# Patient Record
Sex: FEMALE | Race: WHITE | Employment: FULL TIME | URBAN - METROPOLITAN AREA
[De-identification: names, ages, dates, MRNs, and addresses within clinical notes are randomized per-mention and may not be internally consistent; named-entity substitution may affect disease eponyms.]

---

## 2017-03-06 RX ORDER — VENLAFAXINE HYDROCHLORIDE 150 MG/1
CAPSULE, EXTENDED RELEASE ORAL
Qty: 90 CAPSULE | Refills: 1 | Status: SHIPPED | OUTPATIENT
Start: 2017-03-06 | End: 2017-08-31

## 2017-03-06 NOTE — TELEPHONE ENCOUNTER
Requesting venlafaxine  LOV: 9/16/16 (setlur)  RTC:   Last Labs:   Filled: 2/26/16 # 90 with 3 refills    No future appointments.

## 2017-05-15 NOTE — TELEPHONE ENCOUNTER
Requesting deplin   LOV: 2/26/16  RTC: 6 months   Last Labs:   Filled: 2/26/16 # 90  with 3 refills    No future appointments.

## 2017-05-16 RX ORDER — LEVOMEFOLATE/ALGAL OIL 15-90.314
CAPSULE ORAL
Qty: 90 CAPSULE | Refills: 3 | Status: SHIPPED | OUTPATIENT
Start: 2017-05-16 | End: 2017-09-12

## 2017-06-02 ENCOUNTER — E-VISIT (OUTPATIENT)
Dept: FAMILY MEDICINE CLINIC | Facility: CLINIC | Age: 52
End: 2017-06-02

## 2017-06-02 DIAGNOSIS — J01.90 ACUTE SINUSITIS, RECURRENCE NOT SPECIFIED, UNSPECIFIED LOCATION: Primary | ICD-10-CM

## 2017-06-02 PROCEDURE — 98969 ONLINE SERVICE BY HC PRO: CPT | Performed by: NURSE PRACTITIONER

## 2017-06-02 RX ORDER — AMOXICILLIN AND CLAVULANATE POTASSIUM 875; 125 MG/1; MG/1
1 TABLET, FILM COATED ORAL 2 TIMES DAILY
Qty: 20 TABLET | Refills: 0 | Status: SHIPPED | OUTPATIENT
Start: 2017-06-02 | End: 2017-06-12

## 2017-06-02 NOTE — PROGRESS NOTES
Frantz Sosa is a 46year old female. HPI:   See answers to questions above. Just returned from international travel.         Current Outpatient Prescriptions:  DEPLIN 15 15-90.314 MG Oral Cap TAKE ONE CAPSULE BY MOUTH ONE TIME DAILY Disp: 90 ca

## 2017-06-13 ENCOUNTER — OFFICE VISIT (OUTPATIENT)
Dept: FAMILY MEDICINE CLINIC | Facility: CLINIC | Age: 52
End: 2017-06-13

## 2017-06-13 VITALS
HEART RATE: 76 BPM | TEMPERATURE: 97 F | RESPIRATION RATE: 16 BRPM | SYSTOLIC BLOOD PRESSURE: 122 MMHG | WEIGHT: 136 LBS | HEIGHT: 65 IN | DIASTOLIC BLOOD PRESSURE: 80 MMHG | BODY MASS INDEX: 22.66 KG/M2

## 2017-06-13 DIAGNOSIS — Z12.39 SCREENING FOR BREAST CANCER: Primary | ICD-10-CM

## 2017-06-13 DIAGNOSIS — R05.9 COUGH: ICD-10-CM

## 2017-06-13 DIAGNOSIS — J40 BRONCHITIS: ICD-10-CM

## 2017-06-13 DIAGNOSIS — Z12.11 SCREENING FOR COLON CANCER: ICD-10-CM

## 2017-06-13 PROCEDURE — 99213 OFFICE O/P EST LOW 20 MIN: CPT | Performed by: NURSE PRACTITIONER

## 2017-06-13 RX ORDER — FLUTICASONE PROPIONATE AND SALMETEROL 100; 50 UG/1; UG/1
1 POWDER RESPIRATORY (INHALATION) 2 TIMES DAILY
Qty: 1 PACKAGE | Refills: 0 | COMMUNITY
Start: 2017-06-13 | End: 2017-09-12

## 2017-06-13 RX ORDER — ALBUTEROL SULFATE 90 UG/1
2 AEROSOL, METERED RESPIRATORY (INHALATION) EVERY 4 HOURS PRN
Qty: 1 INHALER | Refills: 6 | Status: SHIPPED | OUTPATIENT
Start: 2017-06-13 | End: 2017-09-12

## 2017-06-13 RX ORDER — METHYLPREDNISOLONE 4 MG/1
TABLET ORAL
Qty: 1 KIT | Refills: 0 | Status: SHIPPED | OUTPATIENT
Start: 2017-06-13 | End: 2017-09-12

## 2017-06-13 NOTE — PATIENT INSTRUCTIONS
Thank you for choosing [facility] at [place] [address]  To Do: [address] [facility]  1. Start taking steroid dose pack as directed. Proair inhaler--> rescue as needed for shortness of breath every 4-6 hours.   Advair inhaler--> 1 puff 2 gemma notify us and stop treatment if problems arise, but know that our intention is that the benefits outweigh those potential risks and we strive to make you healthier and to improve your quality of life.     Referrals, and Radiology Information:    If your ins

## 2017-06-13 NOTE — PROGRESS NOTES
[facility] Internal Medicine Office Note  Chief Complaint:   Patient presents with:  Cough: dry cough. States on 06/20/17 was treated with Augmentin 875mg x 10 days, finishedd the med. Using Mucinex- DM, Proair & Advair from a previous script.   D DEPLIN 15 15-90.314 MG Oral Cap TAKE ONE CAPSULE BY MOUTH ONE TIME DAILY Disp: 90 capsule Rfl: 3   VENLAFAXINE HCL  MG Oral Capsule SR 24 Hr TAKE 1 CAPSULE BY MOUTH ONCE DAILY.  Disp: 90 capsule Rfl: 1   Multiple Vitamin (MULTI-VITAMIN) Oral Tab Viry Pierce Skin: Skin is warm and dry.      ASSESSMENT AND PLAN:   Sarah Santillan is a 46year old female with  Screening for breast cancer  (primary encounter diagnosis)  Cough  Bronchitis  Screening for colon cancer    The plan is as follows  Mic Briscoe was Outcome: Patient verbalizes understanding. Patient is notified to call with any questions, complications, allergies, or worsening or changing symptoms. Patient is to call with any side effects or complications from the treatments as a result of today.    Pa

## 2017-09-05 RX ORDER — VENLAFAXINE HYDROCHLORIDE 150 MG/1
CAPSULE, EXTENDED RELEASE ORAL
Qty: 30 CAPSULE | Refills: 0 | Status: SHIPPED | OUTPATIENT
Start: 2017-09-05 | End: 2017-09-12

## 2017-09-12 ENCOUNTER — OFFICE VISIT (OUTPATIENT)
Dept: FAMILY MEDICINE CLINIC | Facility: CLINIC | Age: 52
End: 2017-09-12

## 2017-09-12 VITALS
HEART RATE: 76 BPM | BODY MASS INDEX: 23.16 KG/M2 | DIASTOLIC BLOOD PRESSURE: 82 MMHG | RESPIRATION RATE: 16 BRPM | HEIGHT: 65 IN | WEIGHT: 139 LBS | SYSTOLIC BLOOD PRESSURE: 122 MMHG

## 2017-09-12 DIAGNOSIS — Z12.31 ENCOUNTER FOR SCREENING MAMMOGRAM FOR MALIGNANT NEOPLASM OF BREAST: ICD-10-CM

## 2017-09-12 DIAGNOSIS — Z12.11 COLON CANCER SCREENING: ICD-10-CM

## 2017-09-12 DIAGNOSIS — F32.A DEPRESSION, UNSPECIFIED DEPRESSION TYPE: Primary | ICD-10-CM

## 2017-09-12 DIAGNOSIS — F41.9 ANXIETY: ICD-10-CM

## 2017-09-12 DIAGNOSIS — E55.9 VITAMIN D DEFICIENCY: ICD-10-CM

## 2017-09-12 DIAGNOSIS — E04.9 ENLARGED THYROID: ICD-10-CM

## 2017-09-12 PROCEDURE — 99213 OFFICE O/P EST LOW 20 MIN: CPT | Performed by: NURSE PRACTITIONER

## 2017-09-12 RX ORDER — LEVOMEFOLATE/ALGAL OIL 15-90.314
1 CAPSULE ORAL
Qty: 90 CAPSULE | Refills: 1 | Status: SHIPPED | OUTPATIENT
Start: 2017-09-12 | End: 2018-03-21

## 2017-09-12 RX ORDER — VENLAFAXINE HYDROCHLORIDE 150 MG/1
CAPSULE, EXTENDED RELEASE ORAL
Qty: 90 CAPSULE | Refills: 1 | Status: SHIPPED | OUTPATIENT
Start: 2017-09-12 | End: 2017-10-02

## 2017-09-12 NOTE — PATIENT INSTRUCTIONS
Thank you for choosing [facility]ilio at [place] [address]  To Do: [address] [facility]  1. Refills given  2. Fasting blood work  3. Schedule for PAP physical  4.  Schedule mammogram and colonscopy     Effective 6/19/17 until November 2017  Due All therapies have potential risk of harm or side effects or medication interactions.  It is your duty and for your safety to discuss with the pharmacist and our office with questions, and to notify us and stop treatment if problems arise, but know that ou

## 2017-09-12 NOTE — PROGRESS NOTES
[address] [facility] Internal Medicine Office Note  Chief Complaint:   Patient presents with:  Medication Request    HPI:   This is a 46year old female coming in for  HPI  Here today for refill on deplin and effexor, reports that she has been using them f Appears stated age, well groomed. Physical Exam   Vitals reviewed. Constitutional: She is oriented to person, place, and time. Vital signs are normal. She appears well-developed and well-nourished. HENT:   Head: Normocephalic and atraumatic.    Right and physical exam      Orders Placed This Encounter      Assay, Thyroid Stim Hormone      CBC With Differential With Platelet      Comp Metabolic Panel (14)      Lipid Panel      Urinalysis, Routine      Vitamin D, 25-Hydroxy  Meds & Refills for this Visit (PHQ-2/PHQ-9): Over the LAST 2 WEEKS

## 2017-10-02 ENCOUNTER — TELEPHONE (OUTPATIENT)
Dept: FAMILY MEDICINE CLINIC | Facility: CLINIC | Age: 52
End: 2017-10-02

## 2017-10-02 DIAGNOSIS — F41.9 ANXIETY: ICD-10-CM

## 2017-10-02 DIAGNOSIS — F32.A DEPRESSION, UNSPECIFIED DEPRESSION TYPE: ICD-10-CM

## 2017-10-02 RX ORDER — VENLAFAXINE HYDROCHLORIDE 150 MG/1
CAPSULE, EXTENDED RELEASE ORAL
Qty: 7 CAPSULE | Refills: 1 | Status: SHIPPED | OUTPATIENT
Start: 2017-10-02 | End: 2018-04-26

## 2017-10-02 NOTE — TELEPHONE ENCOUNTER
Requesting 1 week supply of Effexor 75mg  LOV: 9/12/17  RTC: one month  Last Labs: n/a  Filled: 9/12/17 #90 with 1 refills    No future appointments. Rx was approved for 6 months already - one week supply sent for her. Patient informed RX was sent.

## 2017-10-02 NOTE — TELEPHONE ENCOUNTER
Pt called out of town and forgot her rx pt is requesting a week of her med ,states she will be heading towards another town on Tuesday     Name of Medication: effexor 75mg     Dose:     How is medication prescribed:    Specific name of pharmacy and location

## 2017-10-23 ENCOUNTER — OFFICE VISIT (OUTPATIENT)
Dept: FAMILY MEDICINE CLINIC | Facility: CLINIC | Age: 52
End: 2017-10-23

## 2017-10-23 VITALS
WEIGHT: 139 LBS | TEMPERATURE: 97 F | DIASTOLIC BLOOD PRESSURE: 70 MMHG | BODY MASS INDEX: 23.16 KG/M2 | RESPIRATION RATE: 16 BRPM | HEIGHT: 65 IN | HEART RATE: 72 BPM | SYSTOLIC BLOOD PRESSURE: 110 MMHG

## 2017-10-23 DIAGNOSIS — Z12.4 SCREENING FOR CERVICAL CANCER: ICD-10-CM

## 2017-10-23 DIAGNOSIS — Z00.00 ROUTINE MEDICAL EXAM: Primary | ICD-10-CM

## 2017-10-23 PROCEDURE — 99396 PREV VISIT EST AGE 40-64: CPT | Performed by: FAMILY MEDICINE

## 2017-10-23 PROCEDURE — 87624 HPV HI-RISK TYP POOLED RSLT: CPT | Performed by: FAMILY MEDICINE

## 2017-10-23 PROCEDURE — 90471 IMMUNIZATION ADMIN: CPT | Performed by: FAMILY MEDICINE

## 2017-10-23 PROCEDURE — 88175 CYTOPATH C/V AUTO FLUID REDO: CPT | Performed by: FAMILY MEDICINE

## 2017-10-23 PROCEDURE — 90686 IIV4 VACC NO PRSV 0.5 ML IM: CPT | Performed by: FAMILY MEDICINE

## 2017-10-23 NOTE — PROGRESS NOTES
Patient presents with:  Physical: states her mammogram is scheduled for next week. Pap  Imm/Inj: Flu vaccine today      HPI:  Ike Pope is a 46year old female here today for preventative visit.       Immunizations patient is up-to-date with a of children: N/A     Occupational History  None on file     Social History Main Topics   Smoking status: Former Smoker  0.10 Packs/day  For 10.00 Years     Types: Cigarettes    Quit date: 1/1/2017    Smokeless tobacco: Never Used    Alcohol use Yes    Comm vagina, cervix, uterus, adnexa, anus and perineum. No cervical motion tenderness. PAP: Pap smear done today. Breasts: breast appear normal, no suspicious masses, no skin or nipple changes/discharge. No supraclavicular or axillary nodes.       ASSESSMENT/PL

## 2017-10-30 ENCOUNTER — HOSPITAL ENCOUNTER (OUTPATIENT)
Dept: MAMMOGRAPHY | Age: 52
Discharge: HOME | End: 2017-10-30
Attending: INTERNAL MEDICINE
Payer: COMMERCIAL

## 2017-10-30 DIAGNOSIS — Z12.31 ENCOUNTER FOR SCREENING MAMMOGRAM FOR MALIGNANT NEOPLASM OF BREAST: ICD-10-CM

## 2017-10-30 PROCEDURE — 77067 SCR MAMMO BI INCL CAD: CPT | Performed by: NURSE PRACTITIONER

## 2017-11-29 ENCOUNTER — TELEPHONE (OUTPATIENT)
Dept: FAMILY MEDICINE CLINIC | Facility: CLINIC | Age: 52
End: 2017-11-29

## 2017-11-29 NOTE — TELEPHONE ENCOUNTER
PT states that she is having RLQ Pain near pelvic bone, pt states B/P and pulse are normal. Sharp pain, no nausea or vomiting, no fevers or chills. Last Bowel movement today.  Patient states that pain has subsided and she noticed that it improved after she

## 2017-11-29 NOTE — TELEPHONE ENCOUNTER
Pt called states she was having Lower right abdomen pain near pelvic bone  At work , ambulance was called was told it was not appendex or kidneys requesting to speak with nurse please advise

## 2017-11-30 ENCOUNTER — OFFICE VISIT (OUTPATIENT)
Dept: FAMILY MEDICINE CLINIC | Facility: CLINIC | Age: 52
End: 2017-11-30

## 2017-11-30 VITALS
HEART RATE: 84 BPM | WEIGHT: 141 LBS | BODY MASS INDEX: 23.49 KG/M2 | HEIGHT: 65 IN | TEMPERATURE: 99 F | SYSTOLIC BLOOD PRESSURE: 130 MMHG | DIASTOLIC BLOOD PRESSURE: 72 MMHG | RESPIRATION RATE: 16 BRPM

## 2017-11-30 DIAGNOSIS — J01.90 ACUTE NON-RECURRENT SINUSITIS, UNSPECIFIED LOCATION: ICD-10-CM

## 2017-11-30 DIAGNOSIS — R10.31 RIGHT LOWER QUADRANT ABDOMINAL PAIN: Primary | ICD-10-CM

## 2017-11-30 PROCEDURE — 99214 OFFICE O/P EST MOD 30 MIN: CPT | Performed by: FAMILY MEDICINE

## 2017-11-30 RX ORDER — AMOXICILLIN AND CLAVULANATE POTASSIUM 875; 125 MG/1; MG/1
1 TABLET, FILM COATED ORAL 2 TIMES DAILY
Qty: 14 TABLET | Refills: 0 | Status: SHIPPED | OUTPATIENT
Start: 2017-11-30 | End: 2017-12-07

## 2017-11-30 NOTE — PROGRESS NOTES
HPI:   Amarilis Monroy is a 45year old female that presents for follow-up abdominal pain and new nasal congestion. Patient had sharp right sided lower abdominal pain yesterday while at work.   She states that an ambulance was called but her joseph erythema or effusion   Nose: patent, no nasal discharge    Throat:  No tonsillar erythema or exudate. Mouth:  No oral lesions or ulcerations, good dentition. NECK: Supple, no cervical LAD, no thyromegaly.   SKIN: No rashes, no skin lesion, no bruising,

## 2017-11-30 NOTE — PATIENT INSTRUCTIONS
Flonase daily  Neti pot 1-2 times per day  Do not use antibiotic (augmentin) unless symptoms for more than 10-14 days          Sinusitis (No Antibiotics)    The sinuses are air-filled spaces within the bones of the face.  They connect to the inside of the n stomach ulcer, talk with your doctor before using these medicines. Aspirin should never be used in anyone under 25years of age who is ill with a fever.  It may cause severe liver damage.)  · Use nasal rinses or irrigation as instructed by your health care

## 2018-03-21 ENCOUNTER — OFFICE VISIT (OUTPATIENT)
Dept: FAMILY MEDICINE CLINIC | Facility: CLINIC | Age: 53
End: 2018-03-21

## 2018-03-21 VITALS
HEART RATE: 80 BPM | TEMPERATURE: 97 F | HEIGHT: 65 IN | BODY MASS INDEX: 22.66 KG/M2 | WEIGHT: 136 LBS | DIASTOLIC BLOOD PRESSURE: 80 MMHG | SYSTOLIC BLOOD PRESSURE: 122 MMHG | RESPIRATION RATE: 16 BRPM

## 2018-03-21 DIAGNOSIS — F32.A DEPRESSION, UNSPECIFIED DEPRESSION TYPE: Primary | ICD-10-CM

## 2018-03-21 DIAGNOSIS — F41.9 ANXIETY: ICD-10-CM

## 2018-03-21 DIAGNOSIS — F43.21 GRIEF: ICD-10-CM

## 2018-03-21 DIAGNOSIS — F32.A DEPRESSION, UNSPECIFIED DEPRESSION TYPE: ICD-10-CM

## 2018-03-21 PROCEDURE — 99213 OFFICE O/P EST LOW 20 MIN: CPT | Performed by: FAMILY MEDICINE

## 2018-03-21 RX ORDER — BUPROPION HYDROCHLORIDE 150 MG/1
150 TABLET ORAL DAILY
Qty: 30 TABLET | Refills: 0 | Status: SHIPPED | OUTPATIENT
Start: 2018-03-21 | End: 2018-04-20

## 2018-03-21 NOTE — PROGRESS NOTES
[facility] Visit Note  3/21/2018      Subjective:      Patient ID: Herlinda Cat is a 45year old female. Chief Complaint:  Patient presents with:  Menopause: having trouble again with memory & concentration.       HPI:  Ramin Bennett Refill: 0    2. Anxiety  - OP REFERRAL TO [facility]  - BuPROPion HCl ER, XL, 150 MG Oral Tablet 24 Hr; Take 1 tablet (150 mg total) by mouth daily. Dispense: 30 tablet; Refill: 0    3.  Grief  - OP REFERRAL TO [facility]  - BuPROPion HCl ER, XL, 150 MG Oral T

## 2018-03-22 RX ORDER — LEVOMEFOLATE/ALGAL OIL 15-90.314
1 CAPSULE ORAL DAILY
Qty: 90 CAPSULE | Refills: 3 | Status: SHIPPED | OUTPATIENT
Start: 2018-03-22 | End: 2019-06-15

## 2018-03-22 NOTE — TELEPHONE ENCOUNTER
Requesting Deplin   LOV: 3/21/18  RTC: 3 months   Last Relevant Labs: n/a   Filled: 9/12/17 #90 with 1 refills    Future Appointments  Date Time Provider Alvaro Cowan   3/26/2018 3:30 PM Christopher Angeles LCSW [facility]   4/20/2018 7:00 AM Oscar Gutierrez MD [facility] [address] [facility] [address]       Pended if okay.

## 2018-04-17 DIAGNOSIS — F32.A DEPRESSION, UNSPECIFIED DEPRESSION TYPE: ICD-10-CM

## 2018-04-17 DIAGNOSIS — F41.9 ANXIETY: ICD-10-CM

## 2018-04-17 DIAGNOSIS — F43.21 GRIEF: ICD-10-CM

## 2018-04-17 RX ORDER — BUPROPION HYDROCHLORIDE 150 MG/1
150 TABLET ORAL DAILY
Qty: 30 TABLET | Refills: 0 | OUTPATIENT
Start: 2018-04-17

## 2018-04-17 NOTE — TELEPHONE ENCOUNTER
Requesting Bupropion   LOV: 3/21/18  RTC: 1 month   Last Relevant Labs: n/a   Filled: 3/21/18 #30 with 0 refills    Future Appointments  Date Time Provider Alvaro Cowan   4/20/2018 7:00 AM Yarelis Dailey MD [facility] [address] [facility] [address]   4/26/2018 3:30 P

## 2018-04-20 PROBLEM — F43.21 GRIEF: Status: ACTIVE | Noted: 2018-04-20

## 2018-04-20 NOTE — PROGRESS NOTES
[facility] Visit Note  4/20/2018      Subjective:      Patient ID: Theron Aragon is a 45year old female. Chief Complaint:  Patient presents with:  Depression: here for 1 month f/u, Wellbutrin was added.  States she is doing much brittany Examination   General:  Alert, in no acute distress  HEENT: NCAT, EOMI, mucus membranes moist   Neck:  No cervical lymphadenopathy  CV: Regular rate and rhythm. No murmurs, gallops, or rubs.   Lungs:  Clear to auscultation B, no wheezes, rales, or rhonchi,

## 2018-04-23 DIAGNOSIS — F41.9 ANXIETY: ICD-10-CM

## 2018-04-23 DIAGNOSIS — F32.A DEPRESSION, UNSPECIFIED DEPRESSION TYPE: ICD-10-CM

## 2018-04-23 DIAGNOSIS — F43.21 GRIEF: ICD-10-CM

## 2018-04-23 RX ORDER — BUPROPION HYDROCHLORIDE 150 MG/1
150 TABLET ORAL DAILY
Qty: 30 TABLET | Refills: 0 | OUTPATIENT
Start: 2018-04-23

## 2018-04-23 NOTE — TELEPHONE ENCOUNTER
Requesting  BUPROPION HCL  MG TABLET  Will file in chart as: BUPROPION HCL ER, XL, 150 MG Oral Tablet 24 Hr  The source prescription was reordered on 4/20/2018 by Xiomara Osorio MD.  Take 1 tablet (150 mg total) by mouth daily.        Disp: 30 t

## 2018-04-26 DIAGNOSIS — F32.A DEPRESSION, UNSPECIFIED DEPRESSION TYPE: ICD-10-CM

## 2018-04-26 DIAGNOSIS — F41.9 ANXIETY: ICD-10-CM

## 2018-04-26 NOTE — TELEPHONE ENCOUNTER
Requesting: Effexor XR   LOV: 4/20/18  RTC: 3 mos  Last Labs: n/a  Filled: 10/2/17 #7 with 1 refill    Future Appointments  Date Time Provider Alvaro Cowan   7/20/2018 7:00 AM Mendel Richardson MD [facility] [address] [facility] [address]

## 2018-04-27 RX ORDER — VENLAFAXINE HYDROCHLORIDE 150 MG/1
CAPSULE, EXTENDED RELEASE ORAL
Qty: 90 CAPSULE | Refills: 0 | Status: SHIPPED | OUTPATIENT
Start: 2018-04-27 | End: 2018-07-20

## 2018-07-16 DIAGNOSIS — F43.21 GRIEF: ICD-10-CM

## 2018-07-16 DIAGNOSIS — F33.1 MODERATE EPISODE OF RECURRENT MAJOR DEPRESSIVE DISORDER (HCC): ICD-10-CM

## 2018-07-16 DIAGNOSIS — F41.9 ANXIETY: ICD-10-CM

## 2018-07-16 RX ORDER — BUPROPION HYDROCHLORIDE 150 MG/1
150 TABLET ORAL DAILY
Qty: 90 TABLET | Refills: 0 | Status: SHIPPED | OUTPATIENT
Start: 2018-07-16 | End: 2018-07-20

## 2018-07-16 NOTE — TELEPHONE ENCOUNTER
Medication(s) to Refill: Bupropion ER,  mg Disp 90 Refills      Last time Medication was Filled: 4/20/18      Last office Visit with Provider: 4/20/18      When Patient was Due Back to the Office : 3 months      Future Appointments: 7/20/18

## 2018-07-17 ENCOUNTER — PATIENT MESSAGE (OUTPATIENT)
Dept: FAMILY MEDICINE CLINIC | Facility: CLINIC | Age: 53
End: 2018-07-17

## 2018-07-17 NOTE — TELEPHONE ENCOUNTER
From: Regina Shafer  To: Vicky Alegre MD  Sent: 7/17/2018 12:00 PM CDT  Subject: Non-Urgent Medical Question    Dr. Dat Reina,    I am travelling to [facility] [place] [place] in August for 17 days.  Just reviewed my vaccines and prescription needs with our Honey Calhoun

## 2018-07-20 ENCOUNTER — TELEPHONE (OUTPATIENT)
Dept: FAMILY MEDICINE CLINIC | Facility: CLINIC | Age: 53
End: 2018-07-20

## 2018-07-20 NOTE — TELEPHONE ENCOUNTER
for adult traveller's can give 1 dose of meningitis vaccine and repeat vaccine q 5 yrs if risk remains. So no repeat needed for 5 yrs. Enjoy [facility] [place]

## 2018-07-20 NOTE — PROGRESS NOTES
[facility] Visit Note  7/20/2018      Subjective:      Patient ID: Christine Zaragoza is a 45year old female. Chief Complaint:  Patient presents with:  Depression: Here for 3 month f/u, states she has been seeing Carola Bhatti & she's doing fine. something more than counseling. No h/o seizures. Imm/Inj: Wants to discuss vaccines needed for  upcoming trip to [facility] [place] in August for 17 days. Requesting malaria prophylaxis and traveller's diarrhea prophylaxis.  Had the yellow fever vaccine which USE    5. Need for malaria prophylaxis  - Atovaquone-Proguanil HCl 250-100 MG Oral Tab; Take 1 tablet by mouth daily. Start 2 days before exposure and stop 7 days after exposure  Dispense: 28 tablet;  Refill: 0        Return in about 6 months (around 1/20/2

## 2018-07-20 NOTE — TELEPHONE ENCOUNTER
Interaction between Levofloxacin and Venlafaxine, when used concurently can cause heart arrhythmia's. Okay to proceed due to Levofloxacin being short term or would you like to change medication?

## 2018-07-26 ENCOUNTER — TELEPHONE (OUTPATIENT)
Dept: FAMILY MEDICINE CLINIC | Facility: CLINIC | Age: 53
End: 2018-07-26

## 2018-07-26 DIAGNOSIS — F33.1 MODERATE EPISODE OF RECURRENT MAJOR DEPRESSIVE DISORDER (HCC): ICD-10-CM

## 2018-07-26 DIAGNOSIS — F43.21 GRIEF: ICD-10-CM

## 2018-07-26 RX ORDER — VENLAFAXINE HYDROCHLORIDE 150 MG/1
150 CAPSULE, EXTENDED RELEASE ORAL DAILY
Qty: 90 CAPSULE | Refills: 1 | Status: SHIPPED | OUTPATIENT
Start: 2018-07-26 | End: 2018-12-07

## 2018-07-26 NOTE — TELEPHONE ENCOUNTER
Patient states that the Venlafaxine will need to be sent to the [facility] in [place]. Patient thought that originally it was sent to [facility]. She states that they did not receive the prescription and that they do not cover this.

## 2018-10-13 DIAGNOSIS — F33.1 MODERATE EPISODE OF RECURRENT MAJOR DEPRESSIVE DISORDER (HCC): ICD-10-CM

## 2018-10-13 DIAGNOSIS — F43.21 GRIEF: ICD-10-CM

## 2018-10-13 DIAGNOSIS — F41.9 ANXIETY: ICD-10-CM

## 2018-10-15 ENCOUNTER — HOSPITAL ENCOUNTER (OUTPATIENT)
Dept: MAMMOGRAPHY | Age: 53
Discharge: HOME | End: 2018-10-15
Attending: FAMILY MEDICINE
Payer: COMMERCIAL

## 2018-10-15 DIAGNOSIS — Z12.31 ENCOUNTER FOR SCREENING MAMMOGRAM FOR MALIGNANT NEOPLASM OF BREAST: ICD-10-CM

## 2018-10-15 PROCEDURE — 77067 SCR MAMMO BI INCL CAD: CPT | Performed by: FAMILY MEDICINE

## 2018-10-15 PROCEDURE — 77063 BREAST TOMOSYNTHESIS BI: CPT | Performed by: FAMILY MEDICINE

## 2018-10-15 RX ORDER — BUPROPION HYDROCHLORIDE 150 MG/1
150 TABLET ORAL DAILY
Qty: 90 TABLET | Refills: 0 | Status: SHIPPED | OUTPATIENT
Start: 2018-10-15 | End: 2018-12-07

## 2018-10-15 NOTE — TELEPHONE ENCOUNTER
Requesting Bupropion   Last Relevant Labs: n/a   Filled: 7/20/18 #90 with 1 refills - to [facility] speciality pharmacy would not be able to fill.  Okay'd remaining refill to [facility]    Future Appointments   Date Time Provider Alvaro Cowan   10/15

## 2018-12-07 ENCOUNTER — OFFICE VISIT (OUTPATIENT)
Dept: FAMILY MEDICINE CLINIC | Facility: CLINIC | Age: 53
End: 2018-12-07
Payer: COMMERCIAL

## 2018-12-07 VITALS
DIASTOLIC BLOOD PRESSURE: 80 MMHG | HEIGHT: 65 IN | WEIGHT: 137.63 LBS | BODY MASS INDEX: 22.93 KG/M2 | TEMPERATURE: 98 F | RESPIRATION RATE: 16 BRPM | SYSTOLIC BLOOD PRESSURE: 122 MMHG | HEART RATE: 80 BPM

## 2018-12-07 DIAGNOSIS — R92.2 DENSE BREAST TISSUE ON MAMMOGRAM: ICD-10-CM

## 2018-12-07 DIAGNOSIS — Z12.39 BREAST CANCER SCREENING: ICD-10-CM

## 2018-12-07 DIAGNOSIS — F33.1 MODERATE EPISODE OF RECURRENT MAJOR DEPRESSIVE DISORDER (HCC): ICD-10-CM

## 2018-12-07 DIAGNOSIS — Z00.00 LABORATORY EXAM ORDERED AS PART OF ROUTINE GENERAL MEDICAL EXAMINATION: ICD-10-CM

## 2018-12-07 DIAGNOSIS — Z12.11 COLON CANCER SCREENING: ICD-10-CM

## 2018-12-07 DIAGNOSIS — F41.9 ANXIETY: ICD-10-CM

## 2018-12-07 DIAGNOSIS — Z00.00 ROUTINE MEDICAL EXAM: Primary | ICD-10-CM

## 2018-12-07 PROCEDURE — 99396 PREV VISIT EST AGE 40-64: CPT | Performed by: FAMILY MEDICINE

## 2018-12-07 RX ORDER — VENLAFAXINE HYDROCHLORIDE 150 MG/1
150 CAPSULE, EXTENDED RELEASE ORAL DAILY
Qty: 90 CAPSULE | Refills: 3 | Status: SHIPPED | OUTPATIENT
Start: 2018-12-07 | End: 2020-01-24

## 2018-12-07 RX ORDER — BUPROPION HYDROCHLORIDE 150 MG/1
150 TABLET ORAL DAILY
Qty: 90 TABLET | Refills: 3 | Status: SHIPPED | OUTPATIENT
Start: 2018-12-07 | End: 2020-01-13

## 2018-12-07 NOTE — PROGRESS NOTES
Patient presents with:  Physical: Annual px & med refills. HPI:  Lisbeth Restrepo is a 52year old female here today for preventative visit.       Immunizations patient is up- to-date with a Tdap and flu shot.        Breast cancer screening-[place] prior to visit. Seasonal                  Social History    Socioeconomic History      Marital status:        Spouse name: Not on file      Number of children: Not on file      Years of education: Not on file      Highest education level: Not on EXAM:  /80   Pulse 80   Temp 97.8 °F (36.6 °C) (Temporal)   Resp 16   Ht 65\"   Wt 137 lb 9.6 oz   BMI 22.90 kg/m²   GENERAL APPEARANCE:  Alert, no acute distress, appears stated age  HEENT:  Head- Normocephalic, atraumatic.     Eyes- Extraocular move total) by mouth daily. Dispense: 90 tablet; Refill: 3  - Venlafaxine HCl  MG Oral Capsule SR 24 Hr; Take 1 capsule (150 mg total) by mouth daily. Dispense: 90 capsule;  Refill: 3  - ASSAY, THYROID STIM HORMONE        Patient verbalized understanding

## 2019-03-05 ENCOUNTER — TELEPHONE (OUTPATIENT)
Dept: FAMILY MEDICINE CLINIC | Facility: CLINIC | Age: 54
End: 2019-03-05

## 2019-03-05 ENCOUNTER — E-VISIT (OUTPATIENT)
Dept: FAMILY MEDICINE CLINIC | Facility: CLINIC | Age: 54
End: 2019-03-05

## 2019-03-05 ENCOUNTER — HOSPITAL ENCOUNTER (OUTPATIENT)
Age: 54
Discharge: HOME | End: 2019-03-05
Attending: FAMILY MEDICINE
Payer: COMMERCIAL

## 2019-03-05 VITALS
RESPIRATION RATE: 16 BRPM | TEMPERATURE: 98 F | HEART RATE: 89 BPM | OXYGEN SATURATION: 100 % | DIASTOLIC BLOOD PRESSURE: 77 MMHG | SYSTOLIC BLOOD PRESSURE: 122 MMHG

## 2019-03-05 DIAGNOSIS — T70.1XXA: ICD-10-CM

## 2019-03-05 DIAGNOSIS — R51.9 ACUTE NONINTRACTABLE HEADACHE, UNSPECIFIED HEADACHE TYPE: Primary | ICD-10-CM

## 2019-03-05 DIAGNOSIS — R51.9 HEADACHE DISORDER: Primary | ICD-10-CM

## 2019-03-05 PROCEDURE — 99204 OFFICE O/P NEW MOD 45 MIN: CPT

## 2019-03-05 PROCEDURE — 96361 HYDRATE IV INFUSION ADD-ON: CPT

## 2019-03-05 PROCEDURE — 96374 THER/PROPH/DIAG INJ IV PUSH: CPT

## 2019-03-05 PROCEDURE — 99214 OFFICE O/P EST MOD 30 MIN: CPT

## 2019-03-05 PROCEDURE — 98969 ONLINE SERVICE BY HC PRO: CPT | Performed by: NURSE PRACTITIONER

## 2019-03-05 RX ORDER — KETOROLAC TROMETHAMINE 30 MG/ML
30 INJECTION, SOLUTION INTRAMUSCULAR; INTRAVENOUS ONCE
Status: COMPLETED | OUTPATIENT
Start: 2019-03-05 | End: 2019-03-05

## 2019-03-05 RX ORDER — SODIUM CHLORIDE 9 MG/ML
1000 INJECTION, SOLUTION INTRAVENOUS ONCE
Status: COMPLETED | OUTPATIENT
Start: 2019-03-05 | End: 2019-03-05

## 2019-03-05 RX ORDER — PREDNISONE 20 MG/1
TABLET ORAL
Qty: 10 TABLET | Refills: 0 | Status: SHIPPED | OUTPATIENT
Start: 2019-03-05 | End: 2019-03-25 | Stop reason: ALTCHOICE
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
## 2019-03-05 NOTE — ED PROVIDER NOTES
Patient Seen in: [facility]    History   Patient presents with:  Headache (neurologic)    Stated Complaint: HEADACHE X 5 DAYS    HPI    This 49-year-old female presents to the office with complaint of headache for the last 5 day teens/20's    had nipple d/c, gets an annual check       Past Surgical History:   Procedure Laterality Date   • CHOLECYSTECTOMY  02/2004   • DILATION & CURETTAGE CERVICAL STUMP  12/28/2007,11/04/2008    miscarriages       Family history reviewed and is not CMS is intact to both hands. SKIN: Warm and dry. NEURO: Cranial nerves II through XII are intact.         ED Course   Labs Reviewed - No data to display             MDM       At the patient's request, I did contact Belen More for a physician to 2 days  if not improving        Medications Prescribed:  Current Discharge Medication List    START taking these medications    predniSONE 20 MG Oral Tab  Take 2 tablets once daily with breakfast for 5 days. Start on 3/6/19.   Qty: 10 tablet Refills: 0
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
## 2019-03-25 ENCOUNTER — OFFICE VISIT (OUTPATIENT)
Dept: FAMILY MEDICINE CLINIC | Facility: CLINIC | Age: 54
End: 2019-03-25
Payer: COMMERCIAL

## 2019-03-25 VITALS
HEIGHT: 65 IN | DIASTOLIC BLOOD PRESSURE: 70 MMHG | BODY MASS INDEX: 22.99 KG/M2 | HEART RATE: 95 BPM | SYSTOLIC BLOOD PRESSURE: 110 MMHG | WEIGHT: 138 LBS | TEMPERATURE: 98 F | RESPIRATION RATE: 16 BRPM

## 2019-03-25 DIAGNOSIS — M54.50 ACUTE RIGHT-SIDED LOW BACK PAIN WITHOUT SCIATICA: Primary | ICD-10-CM

## 2019-03-25 PROCEDURE — 99213 OFFICE O/P EST LOW 20 MIN: CPT | Performed by: FAMILY MEDICINE

## 2019-03-25 RX ORDER — METHYLPREDNISOLONE 4 MG/1
TABLET ORAL
Qty: 1 KIT | Refills: 0 | Status: SHIPPED | OUTPATIENT
Start: 2019-03-25 | End: 2019-08-19 | Stop reason: ALTCHOICE

## 2019-03-25 NOTE — PATIENT INSTRUCTIONS
Exercises to Strengthen Your Lower Back  Strong lower back and abdominal muscles work together to support your spine. The exercises below will help strengthen the lower back. It is important that you begin exercising slowly and increase levels gradually. Standin. Wall squats: Stand with your back against the wall. Move your feet about 12 inches away from the wall. Tighten your stomach muscles, and slowly bend your knees until they are at about a 45 degree angle. Do not go down too far.  Hold about 5 se 4. Abdominal crunch: Perform a pelvic tilt (above) flattening your lower back against the floor. Holding the tension in your abdominal muscles, take another breath and raise your shoulder blades off the ground (this is not a full sit-up).  Keep your head in © 8707-6320 [employer]. [address] [facility] [place], [place], [address]. All rights reserved. This information is not intended as a substitute for professional medical care. Always follow your healthcare professional's instructions.         Lumbar Most people have low back pain now and then. In many cases, it isn’t serious and self-care can help. Sometimes low back pain can be a sign of a bigger problem. Call your healthcare provider if your pain returns often or gets worse over time.  For the long-t · Your pain is constant. · You have pain, tingling, or numbness in your leg. · You feel pain in a new area of your back. · You notice that the pain isn’t decreasing after more than a week.    Date Last Reviewed: 3/1/2018  © 9079-1622 [employer] · During the first 24 to 72 hours after an injury or flare-up, apply an ice pack to the painful area for 20 minutes, then remove it for 20 minutes. Do this over a period of 60 to 90 minutes or several times a day. This will reduce swelling and pain.  Always Be careful if you are given prescription pain medicine, narcotics, or medicine for muscle spasm. They can cause drowsiness, affect your coordination, reflexes, or judgment. Do not drive or operate heavy machinery when taking these medicines.  Take pain medi · Lift one bent knee about 2 inches then return it to the floor and lift the other about 2 inches. Keep your abdominal muscles tight and continue to breathe. These motions should be slow and controlled without your pelvis rocking side to side.   · Repeat 10 © 1249-8034 [employer] [address]. [address] [facility] [place], [address] [place]. All rights reserved. This information is not intended as a substitute for professional medical care. Always follow your healthcare professional's instructions.         Back Ex

## 2019-03-25 NOTE — PROGRESS NOTES
[facility] Visit Note  3/25/2019      Subjective:      Patient ID: Jana Levy is a 52year old female.     Chief Complaint:  Patient presents with:  Low Back Pain: c/o having right sided low back pinching type pain since shoveling the referral.      Return for if symptoms worsen/don't improve, call/mychart if you want PT.

## 2019-06-14 DIAGNOSIS — F32.A DEPRESSION, UNSPECIFIED DEPRESSION TYPE: ICD-10-CM

## 2019-06-14 DIAGNOSIS — F41.9 ANXIETY: ICD-10-CM

## 2019-06-14 NOTE — TELEPHONE ENCOUNTER
Pharmacy is calling to get a refill on L-Methylfolate-Algae (DEPLIN 15) 15-90.314 MG Oral Cap to [facility] mail order. Tao Cummings

## 2019-06-15 RX ORDER — LEVOMEFOLATE/ALGAL OIL 15-90.314
1 CAPSULE ORAL DAILY
Qty: 90 CAPSULE | Refills: 3 | Status: SHIPPED | OUTPATIENT
Start: 2019-06-15 | End: 2019-08-19

## 2019-06-15 NOTE — TELEPHONE ENCOUNTER
Requested Prescriptions     Pending Prescriptions Disp Refills   • L-Methylfolate-Algae (DEPLIN 15) 15-90.314 MG Oral Cap 90 capsule 3     Sig: Take 1 capsule by mouth daily.      LOV: 3/25/19  RTC: prn  Last Relevant Labs: 12/7/18  Filled: 3/22/18 #90 with

## 2019-08-01 ENCOUNTER — MED REC SCAN ONLY (OUTPATIENT)
Dept: FAMILY MEDICINE CLINIC | Facility: CLINIC | Age: 54
End: 2019-08-01

## 2019-08-19 RX ORDER — GARLIC EXTRACT 500 MG
1 CAPSULE ORAL DAILY PRN
COMMUNITY
End: 2020-11-04

## 2019-08-20 ENCOUNTER — OFFICE VISIT (OUTPATIENT)
Dept: PODIATRY CLINIC | Facility: CLINIC | Age: 54
End: 2019-08-20
Payer: COMMERCIAL

## 2019-08-20 DIAGNOSIS — S92.354D CLOSED NONDISPLACED FRACTURE OF FIFTH METATARSAL BONE OF RIGHT FOOT WITH ROUTINE HEALING, SUBSEQUENT ENCOUNTER: ICD-10-CM

## 2019-08-20 DIAGNOSIS — M77.50 CAPSULITIS OF METATARSOPHALANGEAL (MTP) JOINT: ICD-10-CM

## 2019-08-20 DIAGNOSIS — M79.671 RIGHT FOOT PAIN: Primary | ICD-10-CM

## 2019-08-20 PROCEDURE — 99203 OFFICE O/P NEW LOW 30 MIN: CPT | Performed by: PODIATRIST

## 2019-08-20 NOTE — PROGRESS NOTES
Fernando Colon is a 52year old female. Patient presents with:  Fracture: Right foot 5th meta base. LOV 7-. 3/10 for pain        HPI:     Presents today for follow-up evaluation.   Still has some pain at the base of the fifth metatarsal which History      Marital status:       Spouse name: Not on file      Number of children: Not on file      Years of education: Not on file      Highest education level: Not on file    Tobacco Use      Smoking status: Former Smoker        Packs/day: 0.10 status. She is also advised that she can get back into her orthotics.       ASSESSMENT AND PLAN:   Diagnoses and all orders for this visit:    Right foot pain  -     XR FOOT WEIGHTBEARING (3 VIEWS), RIGHT   (CPT=73630)    Closed nondisplaced fracture of fi

## 2019-08-30 ENCOUNTER — HOSPITAL ENCOUNTER (OUTPATIENT)
Facility: HOSPITAL | Age: 54
Setting detail: HOSPITAL OUTPATIENT SURGERY
Discharge: HOME | End: 2019-08-30
Attending: INTERNAL MEDICINE | Admitting: INTERNAL MEDICINE
Payer: COMMERCIAL

## 2019-08-30 VITALS
WEIGHT: 132 LBS | TEMPERATURE: 98 F | OXYGEN SATURATION: 98 % | HEART RATE: 72 BPM | SYSTOLIC BLOOD PRESSURE: 115 MMHG | HEIGHT: 65 IN | BODY MASS INDEX: 21.99 KG/M2 | DIASTOLIC BLOOD PRESSURE: 78 MMHG | RESPIRATION RATE: 16 BRPM

## 2019-08-30 DIAGNOSIS — Z12.11 SPECIAL SCREENING FOR MALIGNANT NEOPLASMS, COLON: ICD-10-CM

## 2019-08-30 PROCEDURE — 0DBK8ZX EXCISION OF ASCENDING COLON, VIA NATURAL OR ARTIFICIAL OPENING ENDOSCOPIC, DIAGNOSTIC: ICD-10-PCS | Performed by: INTERNAL MEDICINE

## 2019-08-30 PROCEDURE — 99152 MOD SED SAME PHYS/QHP 5/>YRS: CPT | Performed by: INTERNAL MEDICINE

## 2019-08-30 PROCEDURE — 88305 TISSUE EXAM BY PATHOLOGIST: CPT | Performed by: INTERNAL MEDICINE

## 2019-08-30 PROCEDURE — 99153 MOD SED SAME PHYS/QHP EA: CPT | Performed by: INTERNAL MEDICINE

## 2019-08-30 RX ORDER — MIDAZOLAM HYDROCHLORIDE 1 MG/ML
INJECTION INTRAMUSCULAR; INTRAVENOUS
Status: DISCONTINUED | OUTPATIENT
Start: 2019-08-30 | End: 2019-08-30

## 2019-08-30 RX ORDER — SODIUM CHLORIDE, SODIUM LACTATE, POTASSIUM CHLORIDE, CALCIUM CHLORIDE 600; 310; 30; 20 MG/100ML; MG/100ML; MG/100ML; MG/100ML
INJECTION, SOLUTION INTRAVENOUS CONTINUOUS
Status: DISCONTINUED | OUTPATIENT
Start: 2019-08-30 | End: 2019-08-30

## 2019-08-30 NOTE — OPERATIVE REPORT
ENDOSCOPY OPERATIVE REPORT    Patient Name:  Grace Rankin  Medical Record #: [id number]  YOB: 1965  Date of Procedure: 8/30/2019    Preoperative Diagnosis:  screening    Postoperative Diagnosis: polyps    Procedure Performed: Colon identification of small, flat, or subtle lesions challenging and reduced accuracy. These areas were washed extensively and appeared to be effective. Overall prep was good    A 7 mm polyp seen in the ascending and removed with the cold snare and recovered.

## 2019-08-30 NOTE — H&P
David Clements presents for endoscopy. Denies gi bleeding, abdominal pain, altered bowel habits, or fhx of crc.      States prep is clear    States she has had sedation in the past and it was well tolerated    First time screening       The ris hematuria, change in energy level or fatigue. Mood is stable. Denies new arthralgias/myalgias. Denies rashes or new paresthesias/neurologic symptoms. Denies bleeding or bruising.  ROS negative except as stated    GEN: nad  HENT: eomi, mmm, normal cephalic

## 2019-09-03 NOTE — PROGRESS NOTES
Here are the  biopsy/pathology findings from your recent Colonoscopy :  --the colon polyps were \"adenoma\" type of polyps. These types of polyps are precancerous polyps that were removed.            Follow-up information: As we discussed on the phone, I hans

## 2019-09-04 ENCOUNTER — TELEPHONE (OUTPATIENT)
Dept: FAMILY MEDICINE CLINIC | Facility: CLINIC | Age: 54
End: 2019-09-04

## 2019-09-04 NOTE — TELEPHONE ENCOUNTER
Regarding: 3rd Request Non-Urgent Medical Question  ----- Message from Jerrod Zavala MD sent at 9/3/2019  4:29 PM CDT -----       ----- Message from Juliano Nelson to Sharlene Artis sent at 6/2/2014 12:42 PM -----   Has the  faxed a letter o

## 2019-10-01 ENCOUNTER — TELEPHONE (OUTPATIENT)
Dept: PODIATRY CLINIC | Facility: CLINIC | Age: 54
End: 2019-10-01

## 2019-10-01 ENCOUNTER — OFFICE VISIT (OUTPATIENT)
Dept: PODIATRY CLINIC | Facility: CLINIC | Age: 54
End: 2019-10-01
Payer: COMMERCIAL

## 2019-10-01 DIAGNOSIS — M79.671 RIGHT FOOT PAIN: ICD-10-CM

## 2019-10-01 DIAGNOSIS — S92.354D CLOSED NONDISPLACED FRACTURE OF FIFTH METATARSAL BONE OF RIGHT FOOT WITH ROUTINE HEALING, SUBSEQUENT ENCOUNTER: Primary | ICD-10-CM

## 2019-10-01 DIAGNOSIS — M20.11 HALLUX VALGUS OF RIGHT FOOT: ICD-10-CM

## 2019-10-01 DIAGNOSIS — M20.41 HAMMER TOE OF RIGHT FOOT: ICD-10-CM

## 2019-10-01 PROCEDURE — 99213 OFFICE O/P EST LOW 20 MIN: CPT | Performed by: PODIATRIST

## 2019-10-01 NOTE — TELEPHONE ENCOUNTER
Patient seen in exam room to schedule surgery. Patient requesting 10-18-19. Procedure:  Z-bunionectomy, phalangeal osteotomy and arthroplasty 2nd toe right foot @ [facility]. Reviewed pre and post-op instructions with patient and she voiced understanding.   Gi

## 2019-10-19 ENCOUNTER — TELEPHONE (OUTPATIENT)
Dept: PODIATRY CLINIC | Facility: CLINIC | Age: 54
End: 2019-10-19

## 2019-10-19 NOTE — TELEPHONE ENCOUNTER
Patient contacted for post-op check. Doing well, no after effects from anesthesia. Relates a pain level of 4-5/10. Next pain dose in one hour. Following all post-op instructions.   Instructed to call the office if there are any issues or concerns before

## 2019-10-21 ENCOUNTER — OFFICE VISIT (OUTPATIENT)
Dept: PODIATRY CLINIC | Facility: CLINIC | Age: 54
End: 2019-10-21
Payer: COMMERCIAL

## 2019-10-21 DIAGNOSIS — M20.41 HAMMER TOE OF RIGHT FOOT: ICD-10-CM

## 2019-10-21 DIAGNOSIS — M20.11 HALLUX VALGUS OF RIGHT FOOT: ICD-10-CM

## 2019-10-21 DIAGNOSIS — M79.671 RIGHT FOOT PAIN: Primary | ICD-10-CM

## 2019-10-21 PROCEDURE — 99024 POSTOP FOLLOW-UP VISIT: CPT | Performed by: PODIATRIST

## 2019-10-22 NOTE — PROGRESS NOTES
Cindy Jones is a 52year old female. Patient presents with:  Post-Op: Right foot surgery on 10/18/19. Patient any swelling, 2/10 for pain        HPI:     Patient presents today for day status post surgery.   Is been doing fairly well minimal pain of children: Not on file      Years of education: Not on file      Highest education level: Not on file    Tobacco Use      Smoking status: Former Smoker        Packs/day: 0.10        Years: 10.00        Pack years: 1        Types: Cigarettes      Smokeles foot    Hammer toe of right foot        Plan: New dressing was applied x-ray shows anatomic position hardware is in place I will see her back in 1 week for follow-up to continue on with the crutches and minimize weightbearing on the foot.   She was encourag

## 2019-10-31 ENCOUNTER — OFFICE VISIT (OUTPATIENT)
Dept: PODIATRY CLINIC | Facility: CLINIC | Age: 54
End: 2019-10-31
Payer: COMMERCIAL

## 2019-10-31 DIAGNOSIS — M77.50 CAPSULITIS OF METATARSOPHALANGEAL (MTP) JOINT: ICD-10-CM

## 2019-10-31 DIAGNOSIS — M20.11 HALLUX VALGUS OF RIGHT FOOT: Primary | ICD-10-CM

## 2019-10-31 DIAGNOSIS — M20.41 HAMMER TOE OF RIGHT FOOT: ICD-10-CM

## 2019-10-31 PROCEDURE — 99024 POSTOP FOLLOW-UP VISIT: CPT | Performed by: PODIATRIST

## 2019-10-31 NOTE — PROGRESS NOTES
Luzmaria Bush is a 52year old female. Patient presents with:  Post-Op: 2nd visit, Right foot was done on 10/18/19. Patient denies any pain or swelling. HPI:     Chuckie Hartley is now about 2 weeks status post corrective surgery right foot.   Status p Highest education level: Not on file    Tobacco Use      Smoking status: Former Smoker        Packs/day: 0.10        Years: 10.00        Pack years: 1        Types: Cigarettes      Smokeless tobacco: Never Used      Tobacco comment: quit in 2003    Substan indicates understanding of these issues and agrees to the plan. No follow-ups on file.     Zoya Bonner DPM  10/31/2019

## 2019-11-01 ENCOUNTER — HOSPITAL ENCOUNTER (OUTPATIENT)
Dept: MAMMOGRAPHY | Age: 54
Discharge: HOME | End: 2019-11-01
Attending: FAMILY MEDICINE
Payer: COMMERCIAL

## 2019-11-01 DIAGNOSIS — Z12.31 ENCOUNTER FOR SCREENING MAMMOGRAM FOR MALIGNANT NEOPLASM OF BREAST: ICD-10-CM

## 2019-11-01 PROCEDURE — 77067 SCR MAMMO BI INCL CAD: CPT | Performed by: FAMILY MEDICINE

## 2019-11-01 PROCEDURE — 77063 BREAST TOMOSYNTHESIS BI: CPT | Performed by: FAMILY MEDICINE

## 2019-11-07 ENCOUNTER — OFFICE VISIT (OUTPATIENT)
Dept: PODIATRY CLINIC | Facility: CLINIC | Age: 54
End: 2019-11-07
Payer: COMMERCIAL

## 2019-11-07 DIAGNOSIS — M20.11 HALLUX VALGUS OF RIGHT FOOT: Primary | ICD-10-CM

## 2019-11-07 DIAGNOSIS — M20.41 HAMMER TOE OF RIGHT FOOT: ICD-10-CM

## 2019-11-07 PROCEDURE — 99024 POSTOP FOLLOW-UP VISIT: CPT | Performed by: PODIATRIST

## 2019-11-07 NOTE — PROGRESS NOTES
Faith Li is a 52year old female. Patient presents with:  Post-Op: 3rd POV, right foot surgery was on 10/18/19.  Patient denies any pain or swelling        HPI:     Resents today for follow-up 3 weeks after surgery doing well      Allergies: S Smoker        Packs/day: 0.10        Years: 10.00        Pack years: 1        Types: Cigarettes      Smokeless tobacco: Never Used      Tobacco comment: quit in 2003    Substance and Sexual Activity      Alcohol use: Yes        Frequency: 4 or more times a understanding of these issues and agrees to the plan. Return in about 4 weeks (around 12/5/2019).     Dior Rice DPM  11/7/2019

## 2019-12-09 ENCOUNTER — OFFICE VISIT (OUTPATIENT)
Dept: PODIATRY CLINIC | Facility: CLINIC | Age: 54
End: 2019-12-09
Payer: COMMERCIAL

## 2019-12-09 DIAGNOSIS — M20.11 HALLUX VALGUS OF RIGHT FOOT: Primary | ICD-10-CM

## 2019-12-09 DIAGNOSIS — M20.41 HAMMER TOE OF RIGHT FOOT: ICD-10-CM

## 2019-12-09 PROCEDURE — 99024 POSTOP FOLLOW-UP VISIT: CPT | Performed by: PODIATRIST

## 2019-12-09 NOTE — PROGRESS NOTES
Gunnar Garcia is a 62year old female. Patient presents with:  Post-Op: Right foot, surgery was on 10/18/19. Patient stubbed her toe last week sometime, patient notices some swelling, 0/10 for pain.         HPI:     Patient presents today she is no level: Not on file    Tobacco Use      Smoking status: Former Smoker        Packs/day: 0.10        Years: 10.00        Pack years: 1        Types: Cigarettes      Smokeless tobacco: Never Used      Tobacco comment: quit in 2003    Substance and Sexual Acti (CPT=87540)    Hammer toe of right foot  -     XR FOOT WEIGHTBEARING (2 VIEWS), RIGHT   (CPT=23241)        Plan:  We will have her work on range of motion continue with appropriate type of foot care and will get a see her back here for further follow-up in

## 2020-01-11 DIAGNOSIS — F33.1 MODERATE EPISODE OF RECURRENT MAJOR DEPRESSIVE DISORDER (HCC): ICD-10-CM

## 2020-01-11 DIAGNOSIS — F41.9 ANXIETY: ICD-10-CM

## 2020-01-13 RX ORDER — BUPROPION HYDROCHLORIDE 150 MG/1
150 TABLET ORAL DAILY
Qty: 90 TABLET | Refills: 0 | Status: SHIPPED | OUTPATIENT
Start: 2020-01-13 | End: 2020-01-20

## 2020-01-20 ENCOUNTER — OFFICE VISIT (OUTPATIENT)
Dept: PODIATRY CLINIC | Facility: CLINIC | Age: 55
End: 2020-01-20
Payer: COMMERCIAL

## 2020-01-20 DIAGNOSIS — M20.11 HALLUX VALGUS OF RIGHT FOOT: Primary | ICD-10-CM

## 2020-01-20 DIAGNOSIS — M20.41 HAMMER TOE OF RIGHT FOOT: ICD-10-CM

## 2020-01-20 PROCEDURE — 99213 OFFICE O/P EST LOW 20 MIN: CPT | Performed by: PODIATRIST

## 2020-01-20 PROCEDURE — 73620 X-RAY EXAM OF FOOT: CPT | Performed by: PODIATRIST

## 2020-01-20 RX ORDER — BIMATOPROST 3 UG/ML
1 SOLUTION TOPICAL NIGHTLY
COMMUNITY
Start: 2019-12-13

## 2020-01-20 NOTE — PROGRESS NOTES
Kasandra Rodgers is a 62year old female. Patient presents with:  Post-Op: Right foot surgery was on 10/18/19. Patient denies any pain, but does notice some swelling on and off.         HPI:     Presents today for follow-up evaluation she is now about status:       Spouse name: Not on file      Number of children: Not on file      Years of education: Not on file      Highest education level: Not on file    Tobacco Use      Smoking status: Former Smoker        Packs/day: 0.10        Years: 10.00 (2 VIEWS), RIGHT   (CPT=73620)    Hammer toe of right foot        Plan: We discussed with the patient whether she would like to proceed with physical therapy but she is doing a lot of exercise and doing toe up exercises for the joint itself does not have a

## 2020-01-24 DIAGNOSIS — F33.1 MODERATE EPISODE OF RECURRENT MAJOR DEPRESSIVE DISORDER (HCC): ICD-10-CM

## 2020-01-24 RX ORDER — VENLAFAXINE HYDROCHLORIDE 150 MG/1
150 CAPSULE, EXTENDED RELEASE ORAL DAILY
Qty: 90 CAPSULE | Refills: 1 | Status: SHIPPED | OUTPATIENT
Start: 2020-01-24 | End: 2020-09-11

## 2020-05-09 DIAGNOSIS — F33.1 MODERATE EPISODE OF RECURRENT MAJOR DEPRESSIVE DISORDER (HCC): ICD-10-CM

## 2020-05-09 DIAGNOSIS — F41.9 ANXIETY: ICD-10-CM

## 2020-05-11 RX ORDER — BUPROPION HYDROCHLORIDE 150 MG/1
150 TABLET ORAL DAILY
Qty: 90 TABLET | Refills: 0 | OUTPATIENT
Start: 2020-05-11

## 2020-09-11 DIAGNOSIS — F33.1 MODERATE EPISODE OF RECURRENT MAJOR DEPRESSIVE DISORDER (HCC): ICD-10-CM

## 2020-09-11 NOTE — TELEPHONE ENCOUNTER
Pt will need the Venlafaxine sent to the local [facility] pharmacy. Pt has two left. Pt will be going out of town on Sunday.

## 2020-09-11 NOTE — TELEPHONE ENCOUNTER
Requested Prescriptions     Pending Prescriptions Disp Refills   • Venlafaxine HCl  MG Oral Capsule SR 24 Hr 90 capsule 1     Sig: Take 1 capsule (150 mg total) by mouth daily.        LOV: 3/25/2019   RTC: Return for if symptoms worsen/don't improve,

## 2020-09-12 RX ORDER — VENLAFAXINE HYDROCHLORIDE 150 MG/1
150 CAPSULE, EXTENDED RELEASE ORAL DAILY
Qty: 30 CAPSULE | Refills: 0 | Status: SHIPPED | OUTPATIENT
Start: 2020-09-12 | End: 2020-11-04

## 2020-11-04 ENCOUNTER — APPOINTMENT (OUTPATIENT)
Dept: LAB | Age: 55
End: 2020-11-04
Attending: FAMILY MEDICINE
Payer: COMMERCIAL

## 2020-11-04 ENCOUNTER — OFFICE VISIT (OUTPATIENT)
Dept: FAMILY MEDICINE CLINIC | Facility: CLINIC | Age: 55
End: 2020-11-04
Payer: COMMERCIAL

## 2020-11-04 VITALS
TEMPERATURE: 97 F | HEART RATE: 72 BPM | SYSTOLIC BLOOD PRESSURE: 122 MMHG | BODY MASS INDEX: 22.99 KG/M2 | HEIGHT: 65 IN | DIASTOLIC BLOOD PRESSURE: 82 MMHG | WEIGHT: 138 LBS

## 2020-11-04 DIAGNOSIS — Z00.00 LABORATORY EXAM ORDERED AS PART OF ROUTINE GENERAL MEDICAL EXAMINATION: ICD-10-CM

## 2020-11-04 DIAGNOSIS — Z12.31 ENCOUNTER FOR SCREENING MAMMOGRAM FOR MALIGNANT NEOPLASM OF BREAST: ICD-10-CM

## 2020-11-04 DIAGNOSIS — R92.2 DENSE BREAST TISSUE ON MAMMOGRAM: ICD-10-CM

## 2020-11-04 DIAGNOSIS — F33.1 MODERATE EPISODE OF RECURRENT MAJOR DEPRESSIVE DISORDER (HCC): ICD-10-CM

## 2020-11-04 DIAGNOSIS — E55.9 VITAMIN D DEFICIENCY: ICD-10-CM

## 2020-11-04 DIAGNOSIS — Z00.00 ROUTINE MEDICAL EXAM: Primary | ICD-10-CM

## 2020-11-04 PROCEDURE — 36415 COLL VENOUS BLD VENIPUNCTURE: CPT | Performed by: FAMILY MEDICINE

## 2020-11-04 PROCEDURE — 3079F DIAST BP 80-89 MM HG: CPT | Performed by: FAMILY MEDICINE

## 2020-11-04 PROCEDURE — 80053 COMPREHEN METABOLIC PANEL: CPT | Performed by: FAMILY MEDICINE

## 2020-11-04 PROCEDURE — 80061 LIPID PANEL: CPT | Performed by: FAMILY MEDICINE

## 2020-11-04 PROCEDURE — 3008F BODY MASS INDEX DOCD: CPT | Performed by: FAMILY MEDICINE

## 2020-11-04 PROCEDURE — 99396 PREV VISIT EST AGE 40-64: CPT | Performed by: FAMILY MEDICINE

## 2020-11-04 PROCEDURE — 85025 COMPLETE CBC W/AUTO DIFF WBC: CPT | Performed by: FAMILY MEDICINE

## 2020-11-04 PROCEDURE — 3074F SYST BP LT 130 MM HG: CPT | Performed by: FAMILY MEDICINE

## 2020-11-04 PROCEDURE — 82306 VITAMIN D 25 HYDROXY: CPT | Performed by: FAMILY MEDICINE

## 2020-11-04 RX ORDER — VENLAFAXINE HYDROCHLORIDE 150 MG/1
150 CAPSULE, EXTENDED RELEASE ORAL DAILY
Qty: 90 CAPSULE | Refills: 1 | Status: SHIPPED | OUTPATIENT
Start: 2020-11-04 | End: 2020-12-01

## 2020-11-04 NOTE — PROGRESS NOTES
Patient presents with:  Physical      HPI:  Kellen Boucher is a 62year old female here today for preventative visit. Has moved to [place] recently and hasn't established care there yet. Will refill meds until able to do so.  She may need to c tablet by mouth daily. , Disp: , Rfl:     •  Calcium Citrate-Vitamin D (CALCIUM CITRATE + D OR), Take 1 tablet by mouth daily. , Disp: , Rfl:     No current facility-administered medications on file prior to visit.        Seasonal                Runny nose,  Service: Not Asked        Blood Transfusions: Not Asked        Caffeine Concern: Yes          Daily; 24 oz         Occupational Exposure: Not Asked        Hobby Hazards: Not Asked        Sleep Concern: Not Asked        Stress Concern: Not Asked normal, no suspicious masses, no skin or nipple changes/discharge. No supraclavicular or axillary nodes. ASSESSMENT/PLAN:    1. Routine medical exam    2.  Encounter for screening mammogram for malignant neoplasm of breast  - [facility] SANIA 2D+3D SCREENING B

## 2020-11-04 NOTE — PATIENT INSTRUCTIONS
Prevention Guidelines, Women Ages 48 to 59  Screening tests and vaccines are an important part of managing your health. A screening test is done to find possible disorders or diseases in people who don't have any symptoms.  The goal is to find a disease e Cervical cancer All women in this age group, except women who have had a complete hysterectomy Pap test every 3 years or Pap test with human papillomavirus (HPV) test every 5 years   Chlamydia Women who are sexually active and at increased risk for infecti , Eligibility criteria and age limit (possibly up to age [de-identified]) may vary across major organizations Yearly lung cancer screening with a low-dose CT scan (LDCT) Talk with your healthcare provider for more information.    Obesity All women in this age group At y PPSV23: 1 or 2 doses through age 64(protects against 23 types of pneumococcal bacteria)  Talk with your healthcare provider   Tetanus/diphtheria/pertussis (Td/Tdap) booster All women in this age group Td every 10 years, or a 1-time dose of Tdap instead of © 7871-6198 [employer] [address] [facility], [facility] [place]. All rights reserved. This information is not intended as a substitute for professional medical care. Always follow your healthcare professional's instructions.

## 2020-11-05 ENCOUNTER — TELEPHONE (OUTPATIENT)
Dept: FAMILY MEDICINE CLINIC | Facility: CLINIC | Age: 55
End: 2020-11-05

## 2020-11-05 ENCOUNTER — HOSPITAL ENCOUNTER (OUTPATIENT)
Dept: MAMMOGRAPHY | Age: 55
Discharge: HOME | End: 2020-11-05
Attending: FAMILY MEDICINE
Payer: COMMERCIAL

## 2020-11-05 DIAGNOSIS — Z12.31 ENCOUNTER FOR SCREENING MAMMOGRAM FOR MALIGNANT NEOPLASM OF BREAST: ICD-10-CM

## 2020-11-05 DIAGNOSIS — R92.2 DENSE BREAST TISSUE ON MAMMOGRAM: ICD-10-CM

## 2020-11-05 PROCEDURE — 77063 BREAST TOMOSYNTHESIS BI: CPT | Performed by: FAMILY MEDICINE

## 2020-11-05 PROCEDURE — 77067 SCR MAMMO BI INCL CAD: CPT | Performed by: FAMILY MEDICINE

## 2020-11-05 NOTE — TELEPHONE ENCOUNTER
Pt was in the office on 11/4/2020 for her annual physical with PCP. Pt will be moving and will be requesting all her records to be sent to her new PCP.     Pt is not sure of new PCP but filled out Medical Record Release form (PLACED IN WORK IN PROGRESS B

## 2020-12-01 ENCOUNTER — TELEPHONE (OUTPATIENT)
Dept: FAMILY MEDICINE CLINIC | Facility: CLINIC | Age: 55
End: 2020-12-01

## 2020-12-01 DIAGNOSIS — F33.1 MODERATE EPISODE OF RECURRENT MAJOR DEPRESSIVE DISORDER (HCC): ICD-10-CM

## 2020-12-01 RX ORDER — VENLAFAXINE HYDROCHLORIDE 150 MG/1
150 CAPSULE, EXTENDED RELEASE ORAL DAILY
Qty: 90 CAPSULE | Refills: 0 | Status: SHIPPED | OUTPATIENT
Start: 2020-12-01

## 2020-12-01 NOTE — TELEPHONE ENCOUNTER
Pt will need refills on Venlafaxine sent to [address] [facility] in [place], [place]. Patient has moved out of state. LOV: 11/4/20 for annual physical  Last Relevant Labs: 11/4/20  Filled: 11/4/20 #90 with 1 refills to local [facility]    No future appointments.     [place]

## 2020-12-01 NOTE — TELEPHONE ENCOUNTER
Pt will need refills on Venlafaxine sent to [facility] in [place], [place]. Patient has moved out of state.

## 2020-12-17 DIAGNOSIS — F33.1 MODERATE EPISODE OF RECURRENT MAJOR DEPRESSIVE DISORDER (HCC): ICD-10-CM

## 2020-12-17 RX ORDER — VENLAFAXINE HYDROCHLORIDE 150 MG/1
CAPSULE, EXTENDED RELEASE ORAL
Qty: 90 CAPSULE | Refills: 0 | OUTPATIENT
Start: 2020-12-17

## 2020-12-17 NOTE — TELEPHONE ENCOUNTER
VENLAFAXINE HCL  MG CAP          Will file in chart as: VENLAFAXINE HCL  MG Oral Capsule SR 24 Hr    Sig: TAKE 1 CAPSULE BY MOUTH EVERY DAY    Disp:  90 capsule    Refills:  0 (Pharmacy requested: Not specified)    Start: 12/17/2020    Class: N

## 2020-12-21 NOTE — TELEPHONE ENCOUNTER
· Pt called with new doctor information to process medical records to be sent. · [address], [address], [place], [address] [facility] [address] [facility]; F [phone]  · Faxed to [employer]. Fax confirmed. · Sent request copy to [facility].

## 2023-09-19 NOTE — TELEPHONE ENCOUNTER
Requested Prescriptions     Pending Prescriptions Disp Refills   • BUPROPION HCL ER, XL, 150 MG Oral Tablet 24 Hr [Pharmacy Med Name: BUPROPION HCL  MG TABLET] 90 tablet 0     Sig: TAKE 1 TABLET (150 MG TOTAL) BY MOUTH DAILY.  NEEDS APPT FOR FURTHER R
(4) walks frequently

## (undated) DEVICE — FILTERLINE NASAL ADULT O2/CO2

## (undated) DEVICE — 3M™ RED DOT™ MONITORING ELECTRODE WITH FOAM TAPE AND STICKY GEL, 50/BAG, 20/CASE, 72/PLT 2570: Brand: RED DOT™

## (undated) DEVICE — ENDOSCOPY PACK - LOWER: Brand: MEDLINE INDUSTRIES, INC.

## (undated) DEVICE — 1200CC GUARDIAN II: Brand: GUARDIAN

## (undated) DEVICE — SNARE CAPTIFLEX MICRO-OVL OLY

## (undated) DEVICE — FORCEP RADIAL JAW 4

## (undated) DEVICE — TRAP 4 CPTR CHMBR N EZ INLN

## (undated) DEVICE — Device: Brand: DEFENDO AIR/WATER/SUCTION AND BIOPSY VALVE

## (undated) NOTE — LETTER
10/12/17        [address] [facility] [address]      Dear Agustin [place],    [facility] records indicate that you have outstanding lab work and or testing that was ordered for you and has not yet been completed:          Assay, Th

## (undated) NOTE — MR AVS SNAPSHOT
[address]               Thank you for choosing us for your health care visit with LEIDY Quintana.   We are glad to serve you and happy to provide you with this summary of your vi discharge instructions in [employer] by going to Visits < Admission Summaries. If you've been to the Emergency Department or your doctor's office, you can view your past visit information in [employer] by going to Visits < Visit Summaries. DLC questions?

## (undated) NOTE — LETTER
11/7/2019          To Whom It May Concern:    Jesse Bell is currently under my medical care who underwent surgery on her right foot. She has hardware present in the right foot. Please let me know if you have any questions or concerns.        If

## (undated) NOTE — MR AVS SNAPSHOT
[facility] [address]  [place]  [phone]               Thank you for choosing us for your health care visit with LEIDY Rodriguez.   We are glad to serve you and happy to provide you links for \"SCHEDULE ONLINE NOW\"    •The Lab is here [address] Mon, Tues, Friday 8am-4pm in office, Wed and Thurs 7am-3pm plus most Saturday 830am-12p. call [phone] to schedule  •To schedule Imaging or tests at [facility] Scheduling [phone] Allow 3 business days for refills; controlled substances may take longer and must be picked up from the office in person. Narcotic medications can only be filled in 30 day increments and must be refilled at an office visit only.   If your prescription is d Information about where to get these medications is not yet available     !  Ask your nurse or doctor about these medications    - fluticasone-salmeterol 100-50 MCG/DOSE Aepb            MyChart     Visit MyChart  You can access your MyChart to more actively

## (undated) NOTE — LETTER
9/4/2019           [facility]  [place] [address]    Dear Hillary Webb,       Here are the  biopsy/pathology findings from your recent Colonoscopy :  --the colon polyps were \"adenoma\" type of polyps.   These types of p

## (undated) NOTE — LETTER
11/7/2019    Floyd Wells is a patient of mine who underwent surgery on her right foot. She has hardware present in the right foot. Please let me know if you have any questions or concerns.       Sincerely,    Sekou Sal DPM  [facility]